# Patient Record
Sex: MALE | HISPANIC OR LATINO | Employment: FULL TIME | URBAN - METROPOLITAN AREA
[De-identification: names, ages, dates, MRNs, and addresses within clinical notes are randomized per-mention and may not be internally consistent; named-entity substitution may affect disease eponyms.]

---

## 2018-06-06 ENCOUNTER — OCCUPATIONAL MEDICINE (OUTPATIENT)
Dept: URGENT CARE | Facility: CLINIC | Age: 58
End: 2018-06-06
Payer: COMMERCIAL

## 2018-06-06 ENCOUNTER — APPOINTMENT (OUTPATIENT)
Dept: RADIOLOGY | Facility: IMAGING CENTER | Age: 58
End: 2018-06-06
Attending: NURSE PRACTITIONER
Payer: COMMERCIAL

## 2018-06-06 VITALS
SYSTOLIC BLOOD PRESSURE: 142 MMHG | OXYGEN SATURATION: 96 % | HEART RATE: 74 BPM | TEMPERATURE: 97.6 F | DIASTOLIC BLOOD PRESSURE: 96 MMHG | WEIGHT: 231 LBS | HEIGHT: 75 IN | BODY MASS INDEX: 28.72 KG/M2 | RESPIRATION RATE: 16 BRPM

## 2018-06-06 DIAGNOSIS — S67.10XA CRUSHING INJURY OF FINGER, INITIAL ENCOUNTER: ICD-10-CM

## 2018-06-06 DIAGNOSIS — S62.661A CLOSED NONDISPLACED FRACTURE OF DISTAL PHALANX OF LEFT INDEX FINGER, INITIAL ENCOUNTER: ICD-10-CM

## 2018-06-06 DIAGNOSIS — S61.311A LACERATION OF LEFT INDEX FINGER WITHOUT FOREIGN BODY WITH DAMAGE TO NAIL, INITIAL ENCOUNTER: ICD-10-CM

## 2018-06-06 DIAGNOSIS — Z02.1 PRE-EMPLOYMENT DRUG SCREENING: ICD-10-CM

## 2018-06-06 LAB
AMP AMPHETAMINE: NORMAL
BREATH ALCOHOL COMMENT: 0
COC COCAINE: NORMAL
INT CON NEG: NORMAL
INT CON POS: NORMAL
MET METHAMPHETAMINES: NORMAL
OPI OPIATES: NORMAL
PCP PHENCYCLIDINE: NORMAL
POC BREATHALIZER: NORMAL PERCENT (ref 0–0.01)
POC DRUG COMMENT 753798-OCCUPATIONAL HEALTH: NORMAL
THC: NORMAL

## 2018-06-06 PROCEDURE — 73130 X-RAY EXAM OF HAND: CPT | Mod: TC,LT,29 | Performed by: NURSE PRACTITIONER

## 2018-06-06 PROCEDURE — 90471 IMMUNIZATION ADMIN: CPT | Mod: 29 | Performed by: NURSE PRACTITIONER

## 2018-06-06 PROCEDURE — 82075 ASSAY OF BREATH ETHANOL: CPT | Mod: 29 | Performed by: NURSE PRACTITIONER

## 2018-06-06 PROCEDURE — 90715 TDAP VACCINE 7 YRS/> IM: CPT | Mod: 29 | Performed by: NURSE PRACTITIONER

## 2018-06-06 PROCEDURE — 80305 DRUG TEST PRSMV DIR OPT OBS: CPT | Mod: 29 | Performed by: NURSE PRACTITIONER

## 2018-06-06 PROCEDURE — 12001 RPR S/N/AX/GEN/TRNK 2.5CM/<: CPT | Mod: 29 | Performed by: NURSE PRACTITIONER

## 2018-06-06 RX ORDER — ENALAPRIL MALEATE 10 MG/1
10 TABLET ORAL DAILY
COMMUNITY

## 2018-06-06 ASSESSMENT — ENCOUNTER SYMPTOMS
WEAKNESS: 0
FEVER: 0
CHILLS: 0
DIAPHORESIS: 0

## 2018-06-06 ASSESSMENT — PAIN SCALES - GENERAL: PAINLEVEL: 2=MINIMAL-SLIGHT

## 2018-06-06 NOTE — LETTER
[facility]  [address], [address] [place] [address]  Phone:  [phone] - Fax:  [phone]   [facility] Progress Report and Disability Certification  Date of Service: 6/6/2018   No Show:  No  Date / Time of Next Visit: 6/13/2018   Claim Information   Patient Name: Sam Christie  Claim Number:     Employer: [employer]  Date of Injury: 6/6/2018     Insurer / TPA: Azul Brewer  ID / SSN:     Occupation:   Diagnosis: Diagnoses of Closed nondisplaced fracture of distal phalanx of left index finger, initial encounter, Laceration of left index finger without foreign body with damage to nail, initial encounter, and Crushing injury of finger, initial encounter were pertinent to this visit.    Medical Information   Related to Industrial Injury? Yes    Subjective Complaints:  DOI 6/6/18 1st visit.  Patient was at work today.  He was moving a stack of metal plates from one pallet to another.  As the plates were transferred, his left index finger was crushed between the plates.  He was wearing work gloves, but sustained a laceration regardless.  Bleeding was quickly controlled with steady pressure.  He rates pain a 2/10.  No numbness, tingling, or weakness.  He has not taken any medication to treat the symptoms.  He denies any prior history of injury.  No second job or recreational activity as causative or contributing factor.  He is right hand dominant.  Tetanus vaccine is not up to date.     Objective Findings: Patient is alert, oriented, and in no acute distress.  Heart rate and respiratory rate/effort regular.  /96.  Left hand is warm and dry with no erythema, rash or lesion.  Focal 1+ swelling to the distal tip of the left index finger with a 1 cm linear laceration on the fingertip pad.  No bruising.  No injury to the nail.  Wound was irrigated with no evidence of foreign body or debris.  ROM intact.  Sensation and strength intact.  Cap  refill < 2 seconds.  Focal TTP.  Procedure: Laceration Repair  -Risks including bleeding, nerve damage, infection, and poor cosmetic outcome discussed at length. Benefits and alternatives discussed.   -Sterile technique throughout  -Local anesthesia with 2% lidocaine  -Closed with 2 4-0 Nylon interrupted sutures with good wound approximation  -Polysporin and dressing placed  -Patient tolerated well  Tdap vaccine administered in clinic; patient tolerated well.     Pre-Existing Condition(s):     Assessment:   Initial Visit    Status: Additional Care Required  Comments:Follow up in 1 week.  Permanent Disability:No    Plan: Medication  Comments:OTC NSAIDs or tylenol prn pain.  Ice and elevation prn.  Finger splint for day and night use.      Diagnostics: X-ray  Comments:Distal phalanx fracture of the left index finger.    Comments:       Disability Information   Status: Released to Restricted Duty    From:  6/6/2018  Through: 6/13/2018 Restrictions are: Temporary   Physical Restrictions   Sitting:    Standing:    Stooping:    Bending:      Squatting:    Walking:    Climbing:    Pushing:      Pulling:    Other:    Reaching Above Shoulder (L):   Reaching Above Shoulder (R):       Reaching Below Shoulder (L):    Reaching Below Shoulder (R):      Not to exceed Weight Limits   Carrying(hrs):   Weight Limit(lb): < or = to 10 pounds  Comments:Restrictions apply to left hand. Lifting(hrs):   Weight  Limit(lb): < or = to 10 pounds  Comments:Restrictions apply to left hand.   Comments: Keep sutures clean and dry.  Cover with bandage when at work.  Wear finger splint day and night.    Repetitive Actions   Hands: i.e. Fine Manipulations from Grasping:     Feet: i.e. Operating Foot Controls:     Driving / Operate Machinery:     Physician Name: KIRSTIN Pastrana Physician Signature: RESHMA Shelley e-Signature: Dr. Maynor Fan, Medical Director   Clinic Name / Location: [address]  [place], [address]  [place] [address] Clinic Phone Number: Dept: [phone]   Appointment Time: 12:15 Pm Visit Start Time: 12:25 PM   Check-In Time:  12:08 Pm Visit Discharge Time:  1:18 PM   Original-Treating Physician or Chiropractor    Page 2-Insurer/TPA    Page 3-Employer    Page 4-Employee

## 2018-06-06 NOTE — PROGRESS NOTES
"Subjective:      Sam Christie is a 57 y.o. male who presents with Laceration (left index finger crush injury and laceration today )      DOI 6/6/18 1st visit.  Patient was at work today.  He was moving a stack of metal plates from one pallet to another.  As the plates were transferred, his left index finger was crushed between the plates.  He was wearing work gloves, but sustained a laceration regardless.  Bleeding was quickly controlled with steady pressure.  He rates pain a 2/10.  No numbness, tingling, or weakness.  He has not taken any medication to treat the symptoms.  He denies any prior history of injury.  No second job or recreational activity as causative or contributing factor.  He is right hand dominant.  Tetanus vaccine is not up to date.       HPI    Review of Systems   Constitutional: Negative for chills, diaphoresis, fever and malaise/fatigue.   Musculoskeletal: Negative for joint pain.   Neurological: Negative for weakness.          Objective:     /96   Pulse 74   Temp 36.4 °C (97.6 °F)   Resp 16   Ht 1.892 m (6' 2.5\")   Wt 104.8 kg (231 lb)   SpO2 96%   BMI 29.26 kg/m²      Physical Exam    Patient is alert, oriented, and in no acute distress.  Heart rate and respiratory rate/effort regular.  /96.  Left hand is warm and dry with no erythema, rash or lesion.  Focal 1+ swelling to the distal tip of the left index finger with a 1 cm linear laceration on the fingertip pad.  No bruising.  No injury to the nail.  Wound was irrigated with no evidence of foreign body or debris.  ROM intact.  Sensation and strength intact.  Cap refill < 2 seconds.  Focal TTP.  Procedure: Laceration Repair  -Risks including bleeding, nerve damage, infection, and poor cosmetic outcome discussed at length. Benefits and alternatives discussed.   -Sterile technique throughout  -Local anesthesia with 2% lidocaine  -Closed with 2 4-0 Nylon interrupted sutures with good wound approximation  -Polysporin and dressing " placed  -Patient tolerated well  Tdap vaccine administered in clinic; patient tolerated well.    View [employer] Info     DX-HAND 3+ (Order #[id number]) on 6/6/18   Narrative       6/6/2018 12:45 PM    HISTORY/REASON FOR EXAM:  Pain/Deformity Following Trauma.  Impression    TECHNIQUE/EXAM DESCRIPTION AND NUMBER OF VIEWS:  3 views of the LEFT hand.    COMPARISON: None    FINDINGS:  There is a fracture of the tuft and diaphysis of the distal phalanx of the left second finger.  Soft tissue swelling.  No other fractures identified.   Impression       Distal phalanx of the left second finger fracture.   Reading Provider Reading Date   Gen Elam M.D. Jun 6, 2018   Signing Provider Signing Date Signing Time   Gen Elam M.D. Jun 6, 2018 12:55 PM          Assessment/Plan:     1. Closed nondisplaced fracture of distal phalanx of left index finger, initial encounter     2. Laceration of left index finger without foreign body with damage to nail, initial encounter  Tdap =>8yo IM   3. Crushing injury of finger, initial encounter  DX-HAND 3+ LEFT     Discussed exam findings and imaging results with patient.  Keep wound clean and dry.  Cover with bandage when at work.  Leave open to air when at home after first 24 hours.  Finger splint for day and night use.  OTC analgesics, ice, and elevation prn pain.  Work restrictions per D-39.  Follow up in 1 week, sooner if worse.  Patient verbalized understanding of and agreed with plan of care.

## 2018-06-06 NOTE — LETTER
"EMPLOYEE’S CLAIM FOR COMPENSATION/ REPORT OF INITIAL TREATMENT  FORM C-4    EMPLOYEE’S CLAIM - PROVIDE ALL INFORMATION REQUESTED   First Name  Sam Last Name  Shahnaz Birthdate                    1960                Sex  male Claim Number   Home Address  [address] Age  57 y.o. Height  1.892 m (6' 2.5\") Weight  104.8 kg (231 lb) [facility] Zip  [address] Telephone  [phone] (home)    Mailing Address  [id number] [place] [address] Zip  [address] Primary Language Spoken  English    Insurer  [employer]   Employee's Occupation (Job Title) When Injury or Occupational Disease Occurred      Employer's Name  [employer]  Telephone  [phone]    Employer Address  [address]  Zip  [address]    Date of Injury  6/6/2018               Hour of Injury  11:00 AM Date Employer Notified  6/6/2018 Last Day of Work after Injury or Occupational Disease  6/6/2018 Supervisor to Whom Injury Reported  [place]   Address or Location of Accident (if applicable)  [[place]]   What were you doing at the time of accident? (if applicable)  Moving and organizing pallets plates.    How did this injury or occupational disease occur? (Be specific an answer in detail. Use additional sheet if necessary)  Moving some plates from one pallets to another. Bundle of plates fell off on my left index finger..   If you believe that you have an occupational disease, when did you first have knowledge of the disability and it relationship to your employment?  n/a Witnesses to the Accident  Abiel Roy      Nature of Injury or Occupational Disease  Workers' Compensation  Part(s) of Body Injured or Affected  Finger (L), ,     I certify that the above is true and correct to the best of my knowledge and that I have provided " this information in order to obtain the benefits of [place]  I hereby authorize any physician, chiropractor, surgeon, practitioner, or other person, any hospital, including [facility], any medical service organization, any insurance company, or other institution or organization to release to each other, any medical or other information, including benefits paid or payable, pertinent to this injury or disease, except information relative to diagnosis, treatment and/or counseling for AIDS, psychological conditions, alcohol or controlled substances, for which I must give specific authorization.  A Photostat of this authorization shall be as valid as the original.     Date 6/6/18   Place [facility]   Employee’s Signature   THIS REPORT MUST BE COMPLETED AND MAILED WITHIN 3 WORKING DAYS OF TREATMENT   Place  [facility]  Name of Facility  [facility]   Date  6/6/2018 Diagnosis  (S62.661A) Closed nondisplaced fracture of distal phalanx of left index finger, initial encounter  (S61.311A) Laceration of left index finger without foreign body with damage to nail, initial encounter  (S67.10XA) Crushing injury of finger, initial encounter Is there evidence the injured employee was under the influence of alcohol and/or another controlled substance at the time of accident?   Hour  12:25 PM Description of Injury or Disease  Diagnoses of Closed nondisplaced fracture of distal phalanx of left index finger, initial encounter, Laceration of left index finger without foreign body with damage to nail, initial encounter, and Crushing injury of finger, initial encounter were pertinent to this visit. No   Treatment  OTC tylenol or NSAIDs prn pain.  Ice and elevation prn.  Finger splint for day and night use.  Tetanus vaccine updated in clinic.  Have you advised the patient to  "remain off work five days or more? No   X-Ray Findings  Positive  Comments:Distal phalanx fracutre of the left index finger.   If Yes   From Date  To Date      From information given by the employee, together with medical evidence, can you directly connect this injury or occupational disease as job incurred?  Yes If No Full Duty  No Modified Duty  Yes   Is additional medical care by a physician indicated?  Yes  Comments:Follow up in 1 week. If Modified Duty, Specify any Limitations / Restrictions  Limit lift/carry to 10 pounds with the left hand.   Do you know of any previous injury or disease contributing to this condition or occupational disease?                            No   Date  6/6/2018 Print Doctor’s Name KIRSTIN Pastrana I certify the employer’s copy of  this form was mailed on:   Address  [address] Insurer’s Use Only     [facility] Zip  [address]    Provider’s Tax ID Number  [id number] Telephone  Dept: [phone]        e-RESHMA Reyes   e-Signature: Dr. Maynor Fan, Medical Director Degree  APRN        ORIGINAL-TREATING PHYSICIAN OR CHIROPRACTOR    PAGE 2-INSURER/TPA    PAGE 3-EMPLOYER    PAGE 4-EMPLOYEE             Form C-4 (rev10/07)              BRIEF DESCRIPTION OF RIGHTS AND BENEFITS  (Pursuant to [place] 616[place])    Notice of Injury or Occupational Disease (Incident Report Form C-1): If an injury or occupational disease (OD) arises out of and in the  course of employment, you must provide written notice to your employer as soon as practicable, but no later than 7 days after the accident or  OD. Your employer shall maintain a sufficient supply of the required forms.    Claim for Compensation (Form C-4): If medical treatment is sought, the form C-4 is available at the place of initial treatment. A completed  \"Claim for Compensation\" (Form C-4) must be filed within 90 days after an accident or OD. The treating physician or chiropractor " must,  within 3 working days after treatment, complete and mail to the employer, the employer's insurer and third-party , the Claim for  Compensation.    Medical Treatment: If you require medical treatment for your on-the-job injury or OD, you may be required to select a physician or  chiropractor from a list provided by your workers’ compensation insurer, if it has contracted with an Organization for Managed Care (MCO) or  Preferred Provider Organization (PPO) or providers of health care. If your employer has not entered into a contract with an MCO or PPO, you  may select a physician or chiropractor from the Panel of Physicians and Chiropractors. Any medical costs related to your industrial injury or  OD will be paid by your insurer.    Temporary Total Disability (TTD): If your doctor has certified that you are unable to work for a period of at least 5 consecutive days, or 5  cumulative days in a 20-day period, or places restrictions on you that your employer does not accommodate, you may be entitled to TTD  compensation.    Temporary Partial Disability (TPD): If the wage you receive upon reemployment is less than the compensation for TTD to which you are  entitled, the insurer may be required to pay you TPD compensation to make up the difference. TPD can only be paid for a maximum of 24  months.    Permanent Partial Disability (PPD): When your medical condition is stable and there is an indication of a PPD as a result of your injury or  OD, within 30 days, your insurer must arrange for an evaluation by a rating physician or chiropractor to determine the degree of your PPD. The  amount of your PPD award depends on the date of injury, the results of the PPD evaluation and your age and wage.    Permanent Total Disability (PTD): If you are medically certified by a treating physician or chiropractor as permanently and totally disabled  and have been granted a PTD status by your insurer, you are entitled to  receive monthly benefits not to exceed 66 2/3% of your average  monthly wage. The amount of your PTD payments is subject to reduction if you previously received a PPD award.    Vocational Rehabilitation Services: You may be eligible for vocational rehabilitation services if you are unable to return to the job due to a  permanent physical impairment or permanent restrictions as a result of your injury or occupational disease.    Transportation and Per Vanda Reimbursement: You may be eligible for travel expenses and per vanda associated with medical treatment.    Reopening: You may be able to reopen your claim if your condition worsens after claim closure.    Appeal Process: If you disagree with a written determination issued by the insurer or the insurer does not respond to your request, you may  appeal to the Department of Administration, , by following the instructions contained in your determination letter. You must  appeal the determination within 70 days from the date of the determination letter at [address], [address], [place], [place]  [address], or [address], [place] [address]. If you disagree with the  decision, you may appeal to the  Department of Administration, . You must file your appeal within 30 days from the date of the  decision  letter at [address], [address], [place], [place] [address], or [address], [address], [place], [place] [address]. If you  disagree with a decision of an , you may file a petition for judicial review with the District Court. You must do so within 30  days of the Appeal Officer’s decision. You may be represented by an  at your own expense or you may contact the [facility] for possible  representation.    [place]  for Injured Workers (NAIW): If you disagree with a  decision, you may request that NAIW represent you  without  charge at an  Hearing. For information regarding denial of benefits, you may contact the [facility] at: [address], [address], [place] [address], [phone], or [address], [address], [place] [address], [phone]    To File a Complaint with the Division: If you wish to file a complaint with the  of the Division of Industrial Relations (DIR),  please contact the Workers’ Compensation Section, [address], [address], [place], [place] [address], telephone [phone]) [phone], or  [address], [address], [place], [place] [address], telephone [phone].    For assistance with Workers’ Compensation Issues: you may contact the Office of the Governor Consumer Health Assistance, [address] [place] [address], [address], [place], [place] [address], Toll Free 1-120.855.8671, Web site: http://govcha.[facility].nv., E-mail  Lula@[facility].nv.                                                                                                                                                                                                                                   __________________________________________________________________                                                                   _______6/6/18__________                Employee Name / Signature                                                                                                                                                       Date                                                                                                                                                                                                     D-2 (rev. 10/07)

## 2018-06-06 NOTE — PATIENT INSTRUCTIONS
Sutured Wound Care  Sutures are stitches that can be used to close wounds. Taking care of your wound properly can help prevent pain and infection. It can also help your wound to heal more quickly.  How is this treated?  Wound Care  · Keep the wound clean and dry.  · If you were given a bandage (dressing), change it at least one time per day or as told by your doctor. You should also change it if it gets wet or dirty.  · Keep the wound completely dry for the first 24 hours or as told by your doctor. After that time, you may shower or bathe. However, make sure that the wound is not soaked in water until the sutures have been removed.  · Clean the wound one time each day or as told by your doctor.  ¨ Wash the wound with soap and water.  ¨ Rinse the wound with water to remove all soap.  ¨ Pat the wound dry with a clean towel. Do not rub the wound.  · After cleaning the wound, put a thin layer of antibiotic ointment on it as told your doctor. This ointment:  ¨ Helps to prevent infection.  ¨ Keeps the bandage from sticking to the wound.  · Have the sutures removed as told by your doctor.  General Instructions  · Take or apply medicines only as told by your doctor.  · To help prevent scarring, make sure to cover your wound with sunscreen whenever you are outside after the sutures are removed and the wound is healed. Make sure to wear a sunscreen of at least 30 SPF.  · If you were prescribed an antibiotic medicine or ointment, finish all of it even if you start to feel better.  · Do not scratch or pick at the wound.  · Keep all follow-up visits as told by your doctor. This is important.  · Check your wound every day for signs of infection. Watch for:  ¨ Redness, swelling, or pain.  ¨ Fluid, blood, or pus.  · Raise (elevate) the injured area above the level of your heart while you are sitting or lying down, if possible.  · Avoid stretching your wound.  · Drink enough fluids to keep your pee (urine) clear or pale  yellow.  Contact a doctor if:  · You were given a tetanus shot and you have any of these where the needle went in:  ¨ Swelling.  ¨ Very bad pain.  ¨ Redness.  ¨ Bleeding.  · You have a fever.  · A wound that was closed breaks open.  · You notice a bad smell coming from the wound.  · You notice something coming out of the wound, such as wood or glass.  · Medicine does not help your pain.  · You have any of these at the site of the wound.  ¨ More redness.  ¨ More swelling.  ¨ More pain.  · You have any of these coming from the wound.  ¨ Fluid.  ¨ Blood.  ¨ Pus.  · You notice a change in the color of your skin near the wound.  · You need to change the bandage often due to fluid, blood, or pus coming from the wound.  · You have a new rash.  · You have numbness around the wound.  Get help right away if:  · You have very bad swelling around the wound.  · Your pain suddenly gets worse and is very bad.  · You have painful lumps near the wound or on skin that is anywhere on your body.  · You have a red streak going away from the wound.  · The wound is on your hand or foot and you cannot move a finger or toe like normal.  · The wound is on your hand or foot and you notice that your fingers or toes look pale or bluish.  This information is not intended to replace advice given to you by your health care provider. Make sure you discuss any questions you have with your health care provider.  Document Released: 06/05/2009 Document Revised: 05/25/2017 Document Reviewed: 07/30/2014  Elsevier Interactive Patient Education © 2017 [employer]